# Patient Record
Sex: MALE | Race: WHITE | Employment: FULL TIME | URBAN - METROPOLITAN AREA
[De-identification: names, ages, dates, MRNs, and addresses within clinical notes are randomized per-mention and may not be internally consistent; named-entity substitution may affect disease eponyms.]

---

## 2019-07-17 ENCOUNTER — APPOINTMENT (OUTPATIENT)
Dept: ULTRASOUND IMAGING | Age: 24
End: 2019-07-17

## 2019-07-17 ENCOUNTER — HOSPITAL ENCOUNTER (EMERGENCY)
Age: 24
Discharge: HOME | End: 2019-07-17
Attending: EMERGENCY MEDICINE

## 2019-07-17 VITALS
TEMPERATURE: 98.1 F | SYSTOLIC BLOOD PRESSURE: 136 MMHG | OXYGEN SATURATION: 97 % | RESPIRATION RATE: 16 BRPM | HEART RATE: 59 BPM | HEIGHT: 69 IN | DIASTOLIC BLOOD PRESSURE: 69 MMHG | BODY MASS INDEX: 33.33 KG/M2 | WEIGHT: 225 LBS

## 2019-07-17 DIAGNOSIS — N43.3 HYDROCELE, UNSPECIFIED HYDROCELE TYPE: ICD-10-CM

## 2019-07-17 DIAGNOSIS — R10.32 LEFT INGUINAL PAIN: Primary | ICD-10-CM

## 2019-07-17 LAB
BILIRUBIN URINE: NEGATIVE
BLOOD, URINE: NEGATIVE
CLARITY: ABNORMAL
COLOR: YELLOW
EPITHELIAL CELLS, UA: 1 /HPF (ref 0–5)
GLUCOSE URINE: NEGATIVE MG/DL
HYALINE CASTS: 4 /LPF (ref 0–8)
KETONES, URINE: NEGATIVE MG/DL
LEUKOCYTE ESTERASE, URINE: ABNORMAL
MICROSCOPIC EXAMINATION: YES
NITRITE, URINE: NEGATIVE
PH UA: 7 (ref 5–8)
PROTEIN UA: 30 MG/DL
RBC UA: 1 /HPF (ref 0–4)
SPECIFIC GRAVITY UA: >1.03 (ref 1–1.03)
URINE REFLEX TO CULTURE: YES
URINE TYPE: ABNORMAL
UROBILINOGEN, URINE: 0.2 E.U./DL
WBC UA: 15 /HPF (ref 0–5)

## 2019-07-17 PROCEDURE — 87086 URINE CULTURE/COLONY COUNT: CPT

## 2019-07-17 PROCEDURE — 81001 URINALYSIS AUTO W/SCOPE: CPT

## 2019-07-17 PROCEDURE — 99283 EMERGENCY DEPT VISIT LOW MDM: CPT

## 2019-07-17 PROCEDURE — 93975 VASCULAR STUDY: CPT

## 2019-07-17 PROCEDURE — 87591 N.GONORRHOEAE DNA AMP PROB: CPT

## 2019-07-17 PROCEDURE — 87491 CHLMYD TRACH DNA AMP PROBE: CPT

## 2019-07-17 PROCEDURE — 76870 US EXAM SCROTUM: CPT

## 2019-07-17 RX ORDER — IBUPROFEN 800 MG/1
800 TABLET ORAL EVERY 8 HOURS PRN
Qty: 20 TABLET | Refills: 0 | Status: SHIPPED | OUTPATIENT
Start: 2019-07-17

## 2019-07-17 ASSESSMENT — ENCOUNTER SYMPTOMS
COUGH: 0
DIARRHEA: 0
NAUSEA: 0
COLOR CHANGE: 0
CONSTIPATION: 0
VOMITING: 0
SHORTNESS OF BREATH: 0
ABDOMINAL PAIN: 0
BACK PAIN: 0

## 2019-07-17 ASSESSMENT — PAIN DESCRIPTION - LOCATION: LOCATION: GROIN

## 2019-07-17 ASSESSMENT — PAIN DESCRIPTION - PAIN TYPE: TYPE: ACUTE PAIN

## 2019-07-17 ASSESSMENT — PAIN SCALES - GENERAL: PAINLEVEL_OUTOF10: 8

## 2019-07-17 ASSESSMENT — PAIN DESCRIPTION - DESCRIPTORS: DESCRIPTORS: DISCOMFORT

## 2019-07-17 NOTE — LETTER
[address] [place] [address]: [phone]           July 18, 2019    Kristi Blackburn   [facility] [address]          Dear Kristi Blackburn,    This letter is regarding your Emergency Department visit at [facility] Emergency Department on 7/17/2019. Your test results indicate that you were positive for chlamydia but you were treated by the Emergency Department. This is a notification and there is no change in treatment. Your partner(s) should seek treatment also, even if they aren't having symptoms.       Sincerely,         Neri Salgado 98  [address] [address] [phone]

## 2019-07-17 NOTE — ED PROVIDER NOTES
**EVALUATED BY RONNIE**    [address] [facility]  eMERGENCY dEPARTMENT eNCOUnter    Pt Name: Yue Diane  MRN: [id number]  [place] 1995  Date of evaluation: 7/17/2019  Provider: ALINE Max    Chief Complaint:    Chief Complaint   Patient presents with    Groin Pain     pt c/o groin pain with sudden onset at work today at [employer]. Sts that the pain radiates to L leg. Nursing Notes, Past Medical Hx, Past Surgical Hx, Social Hx, Allergies, and Family Hx were all reviewed and agreedwith or any disagreements were addressed in the HPI.    HPI:  (Location, Duration, Timing, Severity, Quality, Assoc Sx, Context, Modifying factors)  This is a  26 y.o. male who presents to the emergency department with complaints of left inguinal pain with radiation into the left testicle that started around 245 area he states that he was wrapping his skid with Saran wrap and suddenly stood up and had pain. Pain radiates into the left inguinal region and into the testicles bilaterally. Denies any aggravating or alleviating symptoms. Patient symptoms are actually improved at time of arrival.  Denies any fevers or chills. No numbness tingling or weakness. No urinary frequency urgency dysuria hematuria. No previous inguinal hernia repairs or testicular surgeries. All other systems were reviewed and are negative. Past Medical/Surgical History:  History reviewed. No pertinent past medical history. History reviewed. No pertinent surgical history. Medications:  Discharge Medication List as of 7/17/2019  6:53 PM            Review of Systems:  Review of Systems   Constitutional: Negative for chills, fatigue and fever. Respiratory: Negative for cough and shortness of breath. Cardiovascular: Negative for chest pain. Gastrointestinal: Negative for abdominal pain, constipation, diarrhea, nausea and vomiting. Genitourinary: Positive for testicular pain.  Negative for decreased urine testicle: 3.3 x 2.1 x 4.0 cm Right: Grey scale: The right testicle demonstrates normal homogeneous echotexture without focal lesion. No evidence of testicular microlithiasis. Doppler Evaluation:  Flow was seen within the right testicle. Scrotal Sac:  No evidence of hydrocele. Epididymis:  No acute abnormality. Left: Grey scale: The left testicle demonstrates normal homogeneous echotexture without focal lesion. No evidence of testicular microlithiasis. Doppler Evaluation:  Flow was seen within the left testicle. Scrotal Sac:  Trace left hydrocele. Epididymis:  No acute abnormality. Flow was seen within each testicle, arguing against acute torsion. Trace left hydrocele. Us Dup Abd Pel Retro Scrot Complete    Result Date: 7/17/2019  EXAMINATION: ULTRASOUND OF THE SCROTUM/TESTICLES WITH COLOR DOPPLER FLOW EVALUATION; DOPPLER EVALUATION OF THE PELVIS 7/17/2019 COMPARISON: None. HISTORY: ORDERING SYSTEM PROVIDED HISTORY: left testicle pain TECHNOLOGIST PROVIDED HISTORY: Reason for Exam: lt sided pain Acuity: Unknown Type of Exam: Unknown FINDINGS: Measurements: Right testicle: 2.9 x 2.3 x 4.3 cm Left testicle: 3.3 x 2.1 x 4.0 cm Right: Grey scale: The right testicle demonstrates normal homogeneous echotexture without focal lesion. No evidence of testicular microlithiasis. Doppler Evaluation:  Flow was seen within the right testicle. Scrotal Sac:  No evidence of hydrocele. Epididymis:  No acute abnormality. Left: Grey scale: The left testicle demonstrates normal homogeneous echotexture without focal lesion. No evidence of testicular microlithiasis. Doppler Evaluation:  Flow was seen within the left testicle. Scrotal Sac:  Trace left hydrocele. Epididymis:  No acute abnormality. Flow was seen within each testicle, arguing against acute torsion. Trace left hydrocele.         MEDICAL DECISION MAKING / ED COURSE:      PROCEDURES:   Procedures    Patient was given:  Medications - No data to display  This is a 26 y.o. male who presents to the emergency department with complaints of left inguinal pain with radiation into the left testicle that started around 245 area he states that he was wrapping his skid with Saran wrap and suddenly stood up and had pain. Pain radiates into the left inguinal region and into the testicles bilaterally. Denies any aggravating or alleviating symptoms. Patient symptoms are actually improved at time of arrival.  Denies any fevers or chills. No numbness tingling or weakness. No urinary frequency urgency dysuria hematuria. No previous inguinal hernia repairs or testicular surgeries. On exam patient is well-appearing, tenderness mostly to the left inguinal region but he also has bilateral testicular pain as well. Testicles look normal in appearance with normal cremasteric reflexes. No pain to penis. Urinalysis with 15 white cells, no bacteria. All leukocytes. Will send for culture, no antibiotics at this time confirmed with attending physician. Patient's ultrasound of scrotum and testicles show normal flow within each testicle no evidence of acute torsion, there is a trace left hydrocele. No further work-up indicated for this at this time. Etc. patient had an inguinal hernia that was reducible. However I do not feel any areas of hernia. He will be referred to PCP for outpatient follow-up. Encouraged Tylenol or Motrin. Return to ER if worsening of symptoms. The patient tolerated their visit well. I have evaluated the patient with physician available for consultation as needed. I have discussed the findings of today's workup with the patient and addressed the patient's questions and concerns. Important warning signs as well as new or worsening symptoms which wouldnecessitate immediate return to the ED were discussed. The plan is to discharge from the ED at this time, and the patient is in stable condition.  The patient acknowledged understanding is agreeable with this

## 2019-07-18 LAB
C. TRACHOMATIS DNA ,URINE: POSITIVE
N. GONORRHOEAE DNA, URINE: NEGATIVE

## 2019-07-19 LAB — URINE CULTURE, ROUTINE: NORMAL
